# Patient Record
Sex: FEMALE | Race: WHITE | Employment: FULL TIME | URBAN - METROPOLITAN AREA
[De-identification: names, ages, dates, MRNs, and addresses within clinical notes are randomized per-mention and may not be internally consistent; named-entity substitution may affect disease eponyms.]

---

## 2018-06-09 ENCOUNTER — APPOINTMENT (OUTPATIENT)
Dept: ULTRASOUND IMAGING | Age: 48
End: 2018-06-09
Attending: EMERGENCY MEDICINE
Payer: COMMERCIAL

## 2018-06-09 ENCOUNTER — HOSPITAL ENCOUNTER (EMERGENCY)
Age: 48
Discharge: HOME | End: 2018-06-09
Attending: EMERGENCY MEDICINE
Payer: COMMERCIAL

## 2018-06-09 VITALS
HEART RATE: 86 BPM | TEMPERATURE: 98.5 F | SYSTOLIC BLOOD PRESSURE: 148 MMHG | BODY MASS INDEX: 29.57 KG/M2 | WEIGHT: 166.89 LBS | OXYGEN SATURATION: 98 % | DIASTOLIC BLOOD PRESSURE: 93 MMHG | RESPIRATION RATE: 18 BRPM | HEIGHT: 63 IN

## 2018-06-09 DIAGNOSIS — M79.10 MUSCLE PAIN: Primary | ICD-10-CM

## 2018-06-09 PROCEDURE — 99282 EMERGENCY DEPT VISIT SF MDM: CPT

## 2018-06-09 PROCEDURE — 93971 EXTREMITY STUDY: CPT

## 2018-06-09 RX ORDER — ESCITALOPRAM OXALATE 20 MG/1
20 TABLET ORAL DAILY
COMMUNITY
End: 2021-12-28 | Stop reason: ALTCHOICE

## 2018-06-09 NOTE — ED NOTES
Patient called out stating that she \"doesnt know if she wants to have ultrasound now and she can not make up her mind or decide. \" ER MD John Berman made aware. John Berman states \"do not cancel ultrasound order at this time and tell patient I still feel we should proceed with US test.\" No additional orders given at this time.

## 2018-06-09 NOTE — ED NOTES
Radiology called at this time to call in [address] for MD order to follow.

## 2018-06-09 NOTE — ED PROVIDER NOTES
EMERGENCY DEPARTMENT HISTORY AND PHYSICAL EXAM      Date: 6/9/2018  Patient Name: Vy Guadarrama    History of Presenting Illness     Chief Complaint   Patient presents with    Leg Pain     Ambulatory to triage. c/o left upper thigh/leg pain since Tuesday \"but it got worse last night and I couldn't sleep at all. \" Denies injury/trauma/falls or recent exercises/heavy lifting. \"I was googling stuff cause it worried me and I think it may be a blood clot. \" Denies blood thinner use. or taking medications prior to arrival.        History Provided By: Patient    HPI: Vy Guadarrama, 57 y.o. female with PMHx significant for depression, presents ambulatory to the ED with cc of a worsening L posterior thigh pain x 4 days. She denies any associated symptoms. Pt states the pain had resolved after applying heat to the affected area, but then returned this morning while she was asleep. Pt reports she had googled her symptoms and is concerned for a blood clot. She also notes her  had a pulmonary embolism, which increased her concerns. She states her sister denies any FHx of blood clots. She denies any recent long distance traveling. Pt denies self medicating with analgesics prior to arrival. She denies the use of hormone replacement therapy. Pt endorses the use of tobacco. She denies any leg swelling. Chief Complaint: thigh pain  Duration: 4 Days  Timing:  Worsening  Location: L posterior  Quality: N/A  Severity: N/A  Modifying Factors: N/A  Associated Symptoms: denies any other associated signs or symptoms    There are no other complaints, changes, or physical findings at this time. PCP: Milton Trent DO    Past History     Past Medical History:  Past Medical History:   Diagnosis Date    Depression     Psychiatric disorder        Past Surgical History:  History reviewed. No pertinent surgical history. Family History:  History reviewed. No pertinent family history.     Social History:  Social History   Substance Use Topics    Smoking status: Current Every Day Smoker     Packs/day: 0.50    Smokeless tobacco: Never Used    Alcohol use No       Allergies:  No Known Allergies    Review of Systems   Review of Systems   Constitutional: Negative for activity change, appetite change, fatigue and fever. HENT: Negative. Negative for congestion, rhinorrhea and sore throat. Respiratory: Negative. Negative for cough, shortness of breath and wheezing. Cardiovascular: Negative. Negative for chest pain and leg swelling. Gastrointestinal: Negative. Negative for abdominal distention, abdominal pain, constipation, diarrhea, nausea and vomiting. Endocrine: Negative. Genitourinary: Negative for difficulty urinating, dysuria, menstrual problem, vaginal bleeding and vaginal discharge. Musculoskeletal: Positive for myalgias (+LLE). Negative for arthralgias and joint swelling. Skin: Negative. Negative for rash. Neurological: Negative. Negative for dizziness, weakness, light-headedness and headaches. Psychiatric/Behavioral: Negative. Physical Exam   Physical Exam   Constitutional: She is oriented to person, place, and time. She appears well-developed and well-nourished. HENT:   Head: Atraumatic. Eyes: EOM are normal.   Cardiovascular: Normal rate, regular rhythm, normal heart sounds and intact distal pulses. Exam reveals no gallop and no friction rub. No murmur heard. Pulmonary/Chest: Effort normal and breath sounds normal. No respiratory distress. She has no wheezes. She has no rales. She exhibits no tenderness. Abdominal: Soft. Bowel sounds are normal. She exhibits no distension and no mass. There is no tenderness. There is no rebound and no guarding. Musculoskeletal: Normal range of motion. She exhibits no edema or tenderness.    Mild L mid posterior thigh tenderness, no obvious thigh circumference discrepancy, no calf tenderness, no BL calf swelling, strength normal, no evidence of obvious strain, mild pain with extension and stretching of hamstrings   Neurological: She is oriented to person, place, and time. Skin: Skin is warm. Psychiatric: She has a normal mood and affect. Nursing note and vitals reviewed. Diagnostic Study Results     Radiologic Studies -   DUPLEX LOWER EXT VENOUS LEFT   Final Result          LEFT LOWER EXTREMITY VENOUS DUPLEX ULTRASOUND     INDICATION: Leg swelling, pain, DVT suspected     COMPARISON: None.     PROCEDURE: Grayscale, color, and spectral Doppler ultrasound imaging of the left  lower extremity veins was performed.     FINDINGS:      The left common femoral, superficial femoral, and popliteal veins demonstrate  normal compressibility, flow, and response to augmentation. No echogenic  thrombi.     The visualized calf veins are patent, with normal compressibility and no  echogenic thrombi.      IMPRESSION  IMPRESSION:   No DVT. Medical Decision Making   I am the first provider for this patient. I reviewed the vital signs, available nursing notes, past medical history, past surgical history, family history and social history. Vital Signs-Reviewed the patient's vital signs. Patient Vitals for the past 12 hrs:   Temp Pulse Resp BP SpO2   06/09/18 0505 98.5 °F (36.9 °C) 86 18 (!) 148/93 98 %     Records Reviewed: Nursing Notes and Old Medical Records    Provider Notes (Medical Decision Making):   Pts risk factor would include smoking, not on birth control, no recent trip/travel, no recent immobilized state. Though concern for blood clot and inability to explain pain or other alternative explanation will obtain dopplar. ED Course:   Initial assessment performed. The patients presenting problems have been discussed, and they are in agreement with the care plan formulated and outlined with them. I have encouraged them to ask questions as they arise throughout their visit.     PROGRESS NOTE:  7:05 AM  Time of sign out is awaiting for doppler. No change of sxs at this time. SIGN OUT:  7:06 AM  Patient's presentation, labs/imaging and plan of care was reviewed with Juan Dupont MD as part of sign out. They will wait for doppler results and dispo as part of the plan discussed with the patient. Juan Dupont MD's assistance in completion of this plan is greatly appreciated but it should be noted that I will be the provider of record for this patient. Yoana Alamo MD    Critical Care Time:   0    Disposition:  DISCHARGE NOTE  8:00 AM  The patient has been re-evaluated and is ready for discharge. Reviewed available results with patient. Counseled patient on diagnosis and care plan. Patient has expressed understanding, and all questions have been answered. Patient agrees with plan and agrees to follow up as recommended, or return to the ED if their symptoms worsen. Discharge instructions have been provided and explained to the patient, along with reasons to return to the ED. PLAN:  1. Discharge  Discharge Medication List as of 6/9/2018  7:45 AM        2. Follow-up Information     Follow up With Details Comments [address] [place] [address], DO In 2 days As needed if symptoms persist [address] [place] [address]  [phone]      [facility] EMERGENCY DEPT  As needed, If symptoms worsen [address] [place] [address]  [phone]        Return to ED if worse     Diagnosis     Clinical Impression:   1. Muscle pain        Attestations: This note is prepared by Ronne Nyhan, acting as Scribe for Yoana Alamo MD.    Yoana Alamo MD: The scribe's documentation has been prepared under my direction and personally reviewed by me in its entirety. I confirm that the note above accurately reflects all work, treatment, procedures, and medical decision making performed by me.

## 2018-06-09 NOTE — DISCHARGE INSTRUCTIONS
Musculoskeletal Pain: Care Instructions  Your Care Instructions  Different problems with the bones, muscles, nerves, ligaments, and tendons in the body can cause pain. One or more areas of your body may ache or burn. Or they may feel tired, stiff, or sore. The medical term for this type of pain is musculoskeletal pain. It can have many different causes. Sometimes the pain is caused by an injury such as a strain or sprain. Or you might have pain from using one part of your body in the same way over and over again. This is called overuse. In some cases, the cause of the pain is another health problem such as arthritis or fibromyalgia. The doctor will examine you and ask you questions about your health to help find the cause of your pain. Blood tests or imaging tests like an X-ray may also be helpful. But sometimes doctors can't find a cause of the pain. Treatment depends on your symptoms and the cause of the pain, if known. The doctor has checked you carefully, but problems can develop later. If you notice any problems or new symptoms, get medical treatment right away. Follow-up care is a key part of your treatment and safety. Be sure to make and go to all appointments, and call your doctor if you are having problems. It's also a good idea to know your test results and keep a list of the medicines you take. How can you care for yourself at home? · Rest until you feel better. · Do not do anything that makes the pain worse. Return to exercise gradually if you feel better and your doctor says it's okay. · Be safe with medicines. Read and follow all instructions on the label. ¨ If the doctor gave you a prescription medicine for pain, take it as prescribed. ¨ If you are not taking a prescription pain medicine, ask your doctor if you can take an over-the-counter medicine. · Put ice or a cold pack on the area for 10 to 20 minutes at a time to ease pain. Put a thin cloth between the ice and your skin.   When should you call for help? Call your doctor now or seek immediate medical care if:  · You have new pain, or your pain gets worse. · You have new symptoms such as a fever, a rash, or chills. Watch closely for changes in your health, and be sure to contact your doctor if:  · You do not get better as expected. Where can you learn more? Go to [employer]  Enter Q624 in the search box to learn more about \"Musculoskeletal Pain: Care Instructions. \"   © 1418-8536 [employer]wise, Incorporated. Care instructions adapted under license by [facility] (which disclaims liability or warranty for this information). This care instruction is for use with your licensed healthcare professional. If you have questions about a medical condition or this instruction, always ask your healthcare professional. [address] any warranty or liability for your use of this information.   Content Version: 12.2.623129; Current as of: November 20, 2015

## 2020-08-18 ENCOUNTER — OFFICE VISIT (OUTPATIENT)
Dept: CARDIOLOGY CLINIC | Age: 50
End: 2020-08-18
Payer: COMMERCIAL

## 2020-08-18 VITALS
BODY MASS INDEX: 31.64 KG/M2 | OXYGEN SATURATION: 97 % | HEIGHT: 63 IN | HEART RATE: 62 BPM | SYSTOLIC BLOOD PRESSURE: 108 MMHG | DIASTOLIC BLOOD PRESSURE: 80 MMHG | WEIGHT: 178.6 LBS | RESPIRATION RATE: 16 BRPM

## 2020-08-18 DIAGNOSIS — I49.8 FLUTTERING HEART: Primary | ICD-10-CM

## 2020-08-18 PROCEDURE — 93000 ELECTROCARDIOGRAM COMPLETE: CPT | Performed by: INTERNAL MEDICINE

## 2020-08-18 PROCEDURE — 99203 OFFICE O/P NEW LOW 30 MIN: CPT | Performed by: INTERNAL MEDICINE

## 2020-08-18 RX ORDER — MONTELUKAST SODIUM 10 MG/1
10 TABLET ORAL DAILY
COMMUNITY
Start: 2020-08-05 | End: 2021-12-28

## 2020-08-18 RX ORDER — ALBUTEROL SULFATE 90 UG/1
1 AEROSOL, METERED RESPIRATORY (INHALATION)
COMMUNITY
Start: 2020-08-05

## 2020-08-18 NOTE — PROGRESS NOTES
1. Have you been to the ER, urgent care clinic since your last visit? Hospitalized since your last visit? No.    2. Have you seen or consulted any other health care providers outside of the [address] [place] [address] since your last visit? Include any pap smears or colon screening. Seen by PCP.       Chief Complaint   Patient presents with    New Patient     referred by PCP for heart fluttering for 3 weeks on and off

## 2020-08-18 NOTE — PROGRESS NOTES
[address] [place] [address] [address], [employer], [address]     Barney Saha is a 52 y.o. female. Pt is here to establish care. Subjective:     Her most recent lipid panel revealed an LDL of 126. Her BP is normal at 108/80. She has a hx of asthma that is managed with albuterol and Singulair. She has a family hx of MI's. Her sister had one at age 46 and her father at age 43 and 48 that led to his passing. She endorses palpitations for the past few weeks that have become less severe over time. She describes the sensation as an irregular rhythm with no concurrent sx's. She denies a hx of tobacco abuse    Patient denies any exertional chest pain, dyspnea, syncope, orthopnea, edema or paroxysmal nocturnal dyspnea. There are no active problems to display for this patient. Jo Ann Gutierrez DO  Past Medical History:   Diagnosis Date    Depression     Psychiatric disorder       No past surgical history on file. No Known Allergies   No family history on file.    Social History     Socioeconomic History    Marital status:      Spouse name: Not on file    Number of children: Not on file    Years of education: Not on file    Highest education level: Not on file   Occupational History    Not on file   Social Needs    Financial resource strain: Not on file    Food insecurity     Worry: Not on file     Inability: Not on file    Transportation needs     Medical: Not on file     Non-medical: Not on file   Tobacco Use    Smoking status: Former Smoker     Packs/day: 0.50     Types: Cigarettes     Last attempt to quit: 3/18/2019     Years since quittin.4    Smokeless tobacco: Never Used   Substance and Sexual Activity    Alcohol use: No    Drug use: No    Sexual activity: Not on file   Lifestyle    Physical activity     Days per week: Not on file     Minutes per session: Not on file    Stress: Not on file   Relationships    Social connections     Talks on phone: Not on file     Gets together: Not on file     Attends Yazidi service: Not on file     Active member of club or organization: Not on file     Attends meetings of clubs or organizations: Not on file     Relationship status: Not on file    Intimate partner violence     Fear of current or ex partner: Not on file     Emotionally abused: Not on file     Physically abused: Not on file     Forced sexual activity: Not on file   Other Topics Concern    Not on file   Social History Narrative    Not on file      Current Outpatient Medications   Medication Sig    albuterol (PROVENTIL HFA, VENTOLIN HFA, PROAIR HFA) 90 mcg/actuation inhaler Take 1 Puff by inhalation every six (6) hours as needed.  montelukast (SINGULAIR) 10 mg tablet Take 10 mg by mouth daily.  escitalopram oxalate (LEXAPRO) 20 mg tablet Take 20 mg by mouth daily. No current facility-administered medications for this visit. Review of Systems  Constitutional: Negative for fever, chills, malaise/fatigue and diaphoresis. Respiratory: Negative for cough, hemoptysis, sputum production, shortness of breath and wheezing. Cardiovascular: +palpitations. Negative for chest pain, orthopnea, claudication, leg swelling and PND. Gastrointestinal: Negative for heartburn, nausea, vomiting, blood in stool and melena. Genitourinary: Negative for dysuria and flank pain. Musculoskeletal: Negative for joint pain and back pain. Skin: Negative for rash. Neurological: Negative for focal weakness, seizures, loss of consciousness, weakness and headaches. Endo/Heme/Allergies: Does not bruise/bleed easily. Psychiatric/Behavioral: Negative for memory loss. The patient does not have insomnia.     Physical Exam:    Visit Vitals  /80 (BP 1 Location: Left arm, BP Patient Position: Sitting)   Pulse 62   Resp 16   Ht 5' 3\" (1.6 m)   Wt 178 lb 9.6 oz (81 kg)   SpO2 97%   BMI 31.64 kg/m²     Wt Readings from Last 3 Encounters:   08/18/20 178 lb 9.6 oz (81 kg)   06/09/18 166 lb 14.2 oz (75.7 kg)       Gen: NAD    Mental Status - Alert. General Appearance - Not in acute distress. Neck - no JVD    Chest and Lung Exam   Inspection: Accessory muscles - No use of accessory muscles in breathing. Auscultation:   Breath sounds: - Normal.     Cardiovascular   Inspection: Jugular vein - Bilateral - Inspection Normal.   Palpation/Percussion:   Apical Impulse: - Normal.   Auscultation: Rhythm - Irregular. Heart Sounds - S1 WNL and S2 WNL. No S3 or S4. Murmurs & Other Heart Sounds: Auscultation of the heart reveals - No Murmurs. Peripheral Vascular   Upper Extremity: Inspection - Bilateral - No Cyanotic nailbeds or Digital clubbing. Lower Extremity:   Palpation: Edema - Bilateral - No edema. Abdomen: Soft, non-tender, bowel sounds are active. Neuro: A&O times 3, CN and motor grossly WNL    Cardiographics  EKG - Sinus bradycardia       Assessment:     Encounter Diagnosis   Name Primary?  Fluttering heart Yes        Plan:     Her BP is normal. She will complete an event monitor for evaluation of heart palpitations.        Written by Angela Betancourt, as dictated by José Antonio Saini MD.

## 2020-08-20 ENCOUNTER — TELEPHONE (OUTPATIENT)
Dept: CARDIOLOGY CLINIC | Age: 50
End: 2020-08-20

## 2020-08-20 NOTE — TELEPHONE ENCOUNTER
Pt states that she is calling in regards to a heart monitor; pt states that she was seen on Tuesday and said that a nurse or someone was suppose to give her a call back in regards to this; please call pt back        Thanks

## 2020-08-21 ENCOUNTER — CLINICAL SUPPORT (OUTPATIENT)
Dept: CARDIOLOGY CLINIC | Age: 50
End: 2020-08-21
Payer: COMMERCIAL

## 2020-08-21 DIAGNOSIS — R00.2 PALPITATIONS: Primary | ICD-10-CM

## 2020-08-21 PROCEDURE — 93228 REMOTE 30 DAY ECG REV/REPORT: CPT | Performed by: INTERNAL MEDICINE

## 2020-09-09 NOTE — PROGRESS NOTES
Please let pt know that she had some early beats which correlated to her symptom events. These are not considered harmful. She had one few second burst which wasn't marked as being a symptom event, that looked like it could be a fast beat coming from the top chambers of her heart. May have been atrial fibrillation. However, it was very brief. F/U with Dr Aleyda Carrillo to review.

## 2020-09-10 ENCOUNTER — TELEPHONE (OUTPATIENT)
Dept: CARDIOLOGY CLINIC | Age: 50
End: 2020-09-10

## 2020-09-21 NOTE — PROGRESS NOTES
[address] [place] [address] [address], [place], [address]        Subjective:     Patient is here today to f/u after reporting palpitations. Event monitor was obtained which showed Sinus Rhythm with symptomatic pvcs. Short burst of paf with rvr noted. She continues to have episodes of palpitations, brief fluttering sensation. They can occur at any time. She found out that her uncle has afib and her maternal grandfather has afib. Patient denies any exertional chest pain, dyspnea, syncope, orthopnea, edema or paroxysmal nocturnal dyspnea. There are no active problems to display for this patient. Alexx Howard DO  Past Medical History:   Diagnosis Date    Depression     Psychiatric disorder       No past surgical history on file. No Known Allergies   No family history on file.    Social History     Socioeconomic History    Marital status:      Spouse name: Not on file    Number of children: Not on file    Years of education: Not on file    Highest education level: Not on file   Occupational History    Not on file   Social Needs    Financial resource strain: Not on file    Food insecurity     Worry: Not on file     Inability: Not on file    Transportation needs     Medical: Not on file     Non-medical: Not on file   Tobacco Use    Smoking status: Former Smoker     Packs/day: 0.50     Types: Cigarettes     Last attempt to quit: 3/18/2019     Years since quittin.5    Smokeless tobacco: Never Used   Substance and Sexual Activity    Alcohol use: No    Drug use: No    Sexual activity: Not on file   Lifestyle    Physical activity     Days per week: Not on file     Minutes per session: Not on file    Stress: Not on file   Relationships    Social connections     Talks on phone: Not on file     Gets together: Not on file     Attends Yazidi service: Not on file     Active member of club or organization: Not on file     Attends meetings of clubs or organizations: Not on file Relationship status: Not on file    Intimate partner violence     Fear of current or ex partner: Not on file     Emotionally abused: Not on file     Physically abused: Not on file     Forced sexual activity: Not on file   Other Topics Concern    Not on file   Social History Narrative    Not on file      Current Outpatient Medications   Medication Sig    metoprolol succinate (TOPROL-XL) 25 mg XL tablet Take 1 Tab by mouth every evening.  aspirin delayed-release 81 mg tablet Take 1 Tab by mouth daily.  albuterol (PROVENTIL HFA, VENTOLIN HFA, PROAIR HFA) 90 mcg/actuation inhaler Take 1 Puff by inhalation every six (6) hours as needed.  montelukast (SINGULAIR) 10 mg tablet Take 10 mg by mouth daily.  escitalopram oxalate (LEXAPRO) 20 mg tablet Take 20 mg by mouth daily. No current facility-administered medications for this visit. Review of Systems  Constitutional: Negative for fever, chills, malaise/fatigue and diaphoresis. Respiratory: Negative for cough, hemoptysis, sputum production, shortness of breath and wheezing. Cardiovascular: +palpitations. Negative for chest pain, orthopnea, claudication, leg swelling and PND. Gastrointestinal: Negative for heartburn, nausea, vomiting, blood in stool and melena. Genitourinary: Negative for dysuria and flank pain. Musculoskeletal: Negative for joint pain and back pain. Skin: Negative for rash. Neurological: Negative for focal weakness, seizures, loss of consciousness, weakness and headaches. Endo/Heme/Allergies: Does not bruise/bleed easily. Psychiatric/Behavioral: Negative for memory loss. The patient does not have insomnia.     Physical Exam:    Visit Vitals  /84 (BP 1 Location: Left arm, BP Patient Position: Sitting)   Pulse 69   Resp 15   Ht 5' 3\" (1.6 m)   Wt 181 lb (82.1 kg)   SpO2 97%   BMI 32.06 kg/m²     Wt Readings from Last 3 Encounters:   09/22/20 181 lb (82.1 kg)   08/18/20 178 lb 9.6 oz (81 kg)   06/09/18 166 lb 14.2 oz (75.7 kg)       Gen: NAD    Mental Status - Alert. General Appearance - Not in acute distress. Neck - no JVD    Chest and Lung Exam   Inspection: Accessory muscles - No use of accessory muscles in breathing. Auscultation:   Breath sounds: - Normal.     Cardiovascular   Inspection: Jugular vein - Bilateral - Inspection Normal.   Palpation/Percussion:   Apical Impulse: - Normal.   Auscultation: Rhythm - regular. Heart Sounds - S1 WNL and S2 WNL. No S3 or S4. Murmurs & Other Heart Sounds: Auscultation of the heart reveals - No Murmurs. Peripheral Vascular   Upper Extremity: Inspection - Bilateral - No Cyanotic nailbeds or Digital clubbing. Lower Extremity:   Palpation: Edema - Bilateral - No edema. Abdomen: Soft, non-tender, bowel sounds are active. Neuro: A&O times 3, CN and motor grossly WNL    Cardiographics  EKG - Sinus Rhythm       Assessment:     Encounter Diagnoses   Name Primary?  Palpitations Yes    Fam hx-ischem heart disease         Plan:     Palpitations: event monitor was obtained which showed symptom events correlated to PVCs. She had a short burst of atrial fib with RVR. She is a chadsvasc of 1. Family hx of afib. Start Toprol XL 25mg daily, ASA 81mg daily. Family hx of CAD: continue risk factor modification, routine exercise, maintain age appropriate BMI. Cholesterol LDL goal 100 or lower. F/U in 1 months.      Kyle Arellano MD

## 2020-09-22 ENCOUNTER — OFFICE VISIT (OUTPATIENT)
Dept: CARDIOLOGY CLINIC | Age: 50
End: 2020-09-22
Payer: COMMERCIAL

## 2020-09-22 VITALS
BODY MASS INDEX: 32.07 KG/M2 | SYSTOLIC BLOOD PRESSURE: 114 MMHG | HEIGHT: 63 IN | RESPIRATION RATE: 15 BRPM | HEART RATE: 69 BPM | OXYGEN SATURATION: 97 % | WEIGHT: 181 LBS | DIASTOLIC BLOOD PRESSURE: 84 MMHG

## 2020-09-22 DIAGNOSIS — Z82.49 FAM HX-ISCHEM HEART DISEASE: ICD-10-CM

## 2020-09-22 DIAGNOSIS — R00.2 PALPITATIONS: Primary | ICD-10-CM

## 2020-09-22 PROCEDURE — 93000 ELECTROCARDIOGRAM COMPLETE: CPT | Performed by: INTERNAL MEDICINE

## 2020-09-22 PROCEDURE — 99214 OFFICE O/P EST MOD 30 MIN: CPT | Performed by: INTERNAL MEDICINE

## 2020-09-22 RX ORDER — ASPIRIN 81 MG/1
81 TABLET ORAL DAILY
Qty: 30 TAB | Refills: 5 | Status: SHIPPED | OUTPATIENT
Start: 2020-09-22 | End: 2021-12-28

## 2020-09-22 RX ORDER — METOPROLOL SUCCINATE 25 MG/1
25 TABLET, EXTENDED RELEASE ORAL EVERY EVENING
Qty: 30 TAB | Refills: 5 | Status: SHIPPED | OUTPATIENT
Start: 2020-09-22 | End: 2021-07-28 | Stop reason: SDUPTHER

## 2020-09-22 NOTE — PROGRESS NOTES
1. Have you been to the ER, urgent care clinic since your last visit? Hospitalized since your last visit? No.    2. Have you seen or consulted any other health care providers outside of the [address] [place] [address] since your last visit? Include any pap smears or colon screening.    No.      Chief Complaint   Patient presents with    Results     discuss monitor results-Afib- still feeling heart flutterings

## 2021-01-21 ENCOUNTER — VIRTUAL VISIT (OUTPATIENT)
Dept: NEUROLOGY | Age: 51
End: 2021-01-21
Payer: COMMERCIAL

## 2021-01-21 DIAGNOSIS — R20.2 PARESTHESIA: ICD-10-CM

## 2021-01-21 DIAGNOSIS — D32.9 MENINGIOMA (HCC): Primary | ICD-10-CM

## 2021-01-21 DIAGNOSIS — H55.00 NYSTAGMUS: ICD-10-CM

## 2021-01-21 PROCEDURE — 99205 OFFICE O/P NEW HI 60 MIN: CPT | Performed by: PSYCHIATRY & NEUROLOGY

## 2021-01-21 NOTE — PROGRESS NOTES
Neurology Consult Note  Rickey Lowe was seen by synchronous (real-time) audio-video technology on 01/21/21. Consent:  She  is aware that this patient-initiated Telehealth encounter is a billable service, with coverage as determined by her insurance carrier. She is aware that she may receive a bill and has provided verbal consent to proceed: Yes    I was in the office while conducting this encounter. Pursuant to the emergency declaration under the [address] [place] [address] waiver authority and the [employer] Act, this Virtual  Visit was conducted, with patient's consent, to reduce the patient's risk of exposure to COVID-19 and provide continuity of care for an established patient. Services were provided through a video synchronous discussion virtually to substitute for in-person clinic visit. HISTORY PROVIDED BY: patient    Chief Complaint:   Chief Complaint   Patient presents with    New Patient      Subjective:    Rickey Lowe is a 52 y.o. right handed female who presents in consultation for vertigo, abnormal brain MRI. This is a 46-year-old right-handed female with history of major depressive disorder, paroxysmal atrial fibrillation, who was referred to the clinic to evaluate for vertigo and abnormal MRI. According to patient, in November 2020, she suddenly started experiencing vertigo that is spinning sensation, it was so severe patient thought she was blacked out. At this point patient went to emergency room and was evaluated for acute stroke. CT scan showed abnormal lesion, follow-up MRI without contrast showed right temporal anterior lobe extra axial mass measuring 13 x 13 mm consistent with possible meningioma. Currently patient says the vertigo is gone, she occasionally experiences numbness or tingling sensation of the hands. There is also occasional headache.   As per standard of care, I will obtain MRI of the brain with and without gadolinium to qualify the lesion. Patient denies loss of consciousness dysphagia or odynophagia. Review of Systems - General ROS: negative for - fatigue or sleep disturbance  Psychological ROS: positive for - anxiety and depression  Ophthalmic ROS: positive for - blurry vision and decreased vision  ENT ROS: positive for - headaches, vertigo and visual changes  Allergy and Immunology ROS: negative  Hematological and Lymphatic ROS: negative  Endocrine ROS: negative  Respiratory ROS: no cough, shortness of breath, or wheezing  Cardiovascular ROS: no chest pain or dyspnea on exertion  Gastrointestinal ROS: no abdominal pain, change in bowel habits, or black or bloody stools  Genito-Urinary ROS: no dysuria, trouble voiding, or hematuria  Musculoskeletal ROS: negative  Neurological ROS: positive for - dizziness, headaches, numbness/tingling and visual changes  Dermatological ROS: negative    Past Medical History:   Diagnosis Date    Depression     Psychiatric disorder       History reviewed. No pertinent surgical history.    Social History     Socioeconomic History    Marital status:      Spouse name: Not on file    Number of children: Not on file    Years of education: Not on file    Highest education level: Not on file   Occupational History    Not on file   Social Needs    Financial resource strain: Not on file    Food insecurity     Worry: Not on file     Inability: Not on file    Transportation needs     Medical: Not on file     Non-medical: Not on file   Tobacco Use    Smoking status: Former Smoker     Packs/day: 0.50     Types: Cigarettes     Quit date: 3/18/2019     Years since quittin.8    Smokeless tobacco: Never Used   Substance and Sexual Activity    Alcohol use: No    Drug use: No    Sexual activity: Not on file   Lifestyle    Physical activity     Days per week: Not on file     Minutes per session: Not on file    Stress: Not on file Relationships    Social connections     Talks on phone: Not on file     Gets together: Not on file     Attends Protestant service: Not on file     Active member of club or organization: Not on file     Attends meetings of clubs or organizations: Not on file     Relationship status: Not on file    Intimate partner violence     Fear of current or ex partner: Not on file     Emotionally abused: Not on file     Physically abused: Not on file     Forced sexual activity: Not on file   Other Topics Concern    Not on file   Social History Narrative    Not on file     History reviewed. No pertinent family history. Objective:   ROS  As per HPI    No Known Allergies     Meds:  Outpatient Medications Prior to Visit   Medication Sig Dispense Refill    metoprolol succinate (TOPROL-XL) 25 mg XL tablet Take 1 Tab by mouth every evening. 30 Tab 5    aspirin delayed-release 81 mg tablet Take 1 Tab by mouth daily. 30 Tab 5    albuterol (PROVENTIL HFA, VENTOLIN HFA, PROAIR HFA) 90 mcg/actuation inhaler Take 1 Puff by inhalation every six (6) hours as needed.  escitalopram oxalate (LEXAPRO) 20 mg tablet Take 20 mg by mouth daily.  montelukast (SINGULAIR) 10 mg tablet Take 10 mg by mouth daily. No facility-administered medications prior to visit. Imaging:  MRI Results (most recent):  No results found for this or any previous visit. CT Results (most recent):  No results found for this or any previous visit. Reviewed records in [employer] [facility] [employer] tab today    Lab Review   No results found for this or any previous visit. Exam:  There were no vitals taken for this visit. General:  Alert, cooperative, no distress. Head:  Normocephalic, without obvious abnormality, atraumatic. Respiratory:  Heart:   Non labored breathing  deferred   Neck:   deferred   Extremities:  no cyanosis or edema. Pulses: deferred       Neurologic:  MS: Alert and oriented x 4, speech intact.  Language intact, able to name, repeat, and follow all commands. Attention and fund of knowledge appropriate. Recent and remote memory intact. Cranial Nerves:  II: visual fields deferred   II: pupils Equal, round,   II: optic disc deferred   III,VII: ptosis none   III,IV,VI: extraocular muscles  EOMI, no nystagmus or diplopia   V: facial light touch sensation  deferred   VII: facial muscle function   symmetric   VIII: hearing intact   IX: soft palate elevation  deferred   XI: trapezius strength   good neck movement   XI: sternocleidomastoid strength  good shrug   XII: tongue  Midline     Motor: normal bulk  no tremor              Strength: Moves all extremities, no PD  Sensory: Deferred  Coordination: FTN and HTS intact, ERIC intact. Gait: normal gait. Reflexes:Deferred           Assessment/Plan       ICD-10-CM ICD-9-CM    1. Meningioma (HCC)  D32.9 225.2    2. Paresthesia  R20.2 782.0    Plan:  I will obtain MRI of the brain with and without gadolinium to evaluate for intracranial lesion. I will see patient back in 4 weeks to go over the result and decide on the next plan of management. Thank you very much for this consultation.      Signed:  Radha Valles MD  1/21/2021 Hypertension Bacteriuria

## 2021-01-28 ENCOUNTER — HOSPITAL ENCOUNTER (OUTPATIENT)
Dept: MRI IMAGING | Age: 51
Discharge: HOME | End: 2021-01-28
Attending: PSYCHIATRY & NEUROLOGY
Payer: COMMERCIAL

## 2021-01-28 DIAGNOSIS — R20.2 PARESTHESIA: ICD-10-CM

## 2021-01-28 DIAGNOSIS — H55.00 NYSTAGMUS: ICD-10-CM

## 2021-01-28 DIAGNOSIS — D32.9 MENINGIOMA (HCC): ICD-10-CM

## 2021-01-28 PROCEDURE — 70553 MRI BRAIN STEM W/O & W/DYE: CPT

## 2021-01-28 PROCEDURE — 74011250636 HC RX REV CODE- 250/636: Performed by: PSYCHIATRY & NEUROLOGY

## 2021-01-28 PROCEDURE — A9575 INJ GADOTERATE MEGLUMI 0.1ML: HCPCS | Performed by: PSYCHIATRY & NEUROLOGY

## 2021-01-28 RX ORDER — GADOTERATE MEGLUMINE 376.9 MG/ML
17 INJECTION INTRAVENOUS
Status: COMPLETED | OUTPATIENT
Start: 2021-01-28 | End: 2021-01-28

## 2021-01-28 RX ADMIN — GADOTERATE MEGLUMINE 17 ML: 376.9 INJECTION INTRAVENOUS at 18:38

## 2021-03-05 ENCOUNTER — OFFICE VISIT (OUTPATIENT)
Dept: NEUROLOGY | Age: 51
End: 2021-03-05
Payer: COMMERCIAL

## 2021-03-05 VITALS
RESPIRATION RATE: 18 BRPM | TEMPERATURE: 98.7 F | SYSTOLIC BLOOD PRESSURE: 122 MMHG | BODY MASS INDEX: 33.66 KG/M2 | HEART RATE: 77 BPM | OXYGEN SATURATION: 98 % | HEIGHT: 63 IN | WEIGHT: 190 LBS | DIASTOLIC BLOOD PRESSURE: 78 MMHG

## 2021-03-05 DIAGNOSIS — D32.9 MENINGIOMA (HCC): Primary | ICD-10-CM

## 2021-03-05 PROCEDURE — 99214 OFFICE O/P EST MOD 30 MIN: CPT | Performed by: PSYCHIATRY & NEUROLOGY

## 2021-03-05 NOTE — PROGRESS NOTES
Neurology Progress Note    NAME:  Latia Joseph   :   1970   MRN:   [id number]     Date/Time:  3/5/2021  Subjective:      Latia Joseph is a 52 y.o. female here today for follow-up for abnormal MRI, vertigo, test results. Patient was accompanied by her . She says she has been doing fairly well, no vertigo headache or dizziness since the last visit. She says her numbness is very minimal, only in her hands when she uses her hands a lot. Denies loss of consciousness, dysphagia, odynophagia. MRI of the brain reviewed with patient showed enhancing lesion in the right sphenoid measuring 1.6 x 1.3 x 1.6 cm with dural tail consistent with right sphenoid wing meningioma. I explained to patient and her  that the lesion is benign and does not need any surgical intervention, will monitor every 3 to 4 years or if there is any new symptoms. Review of Systems - General ROS: negative for - fatigue or sleep disturbance  Psychological ROS: positive for - anxiety and depression  Ophthalmic ROS: positive for - blurry vision and decreased vision  ENT ROS: positive for - headaches, vertigo and visual changes  Allergy and Immunology ROS: negative  Hematological and Lymphatic ROS: negative  Endocrine ROS: negative  Respiratory ROS: no cough, shortness of breath, or wheezing  Cardiovascular ROS: no chest pain or dyspnea on exertion  Gastrointestinal ROS: no abdominal pain, change in bowel habits, or black or bloody stools  Genito-Urinary ROS: no dysuria, trouble voiding, or hematuria  Musculoskeletal ROS: negative  Neurological ROS: positive for - dizziness, headaches, numbness/tingling and visual changes  Dermatological ROS: negative      Medications reviewed:  Current Outpatient Medications   Medication Sig Dispense Refill    metoprolol succinate (TOPROL-XL) 25 mg XL tablet Take 1 Tab by mouth every evening. 30 Tab 5    aspirin delayed-release 81 mg tablet Take 1 Tab by mouth daily.  30 Tab 5    albuterol (PROVENTIL HFA, VENTOLIN HFA, PROAIR HFA) 90 mcg/actuation inhaler Take 1 Puff by inhalation every six (6) hours as needed.  escitalopram oxalate (LEXAPRO) 20 mg tablet Take 20 mg by mouth daily.  montelukast (SINGULAIR) 10 mg tablet Take 10 mg by mouth daily. Objective:   Vitals:  Vitals:    03/05/21 1544   BP: 122/78   Pulse: 77   Resp: 18   Temp: 98.7 °F (37.1 °C)   TempSrc: Temporal   SpO2: 98%   Weight: 190 lb (86.2 kg)   Height: 5' 3\" (1.6 m)   PainSc:   0 - No pain               Lab Data Reviewed:  No results found for: WBC, HCT, HGB, PLT, HCTEXT, HGBEXT, PLTEXT    No results found for: NA, K, CL, CO2, GLU, BUN, CREA, CA    No components found for: TROPQUANT    No results found for: ROSEANNA      No results found for: HBA1C, HGBE8, CJG1TBHO, UDH2BRQZ     No results found for: B12LT, FOL, RBCF    No results found for: ROSEANNA, ANARX, ANAIGG, XBANA    No results found for: CHOL, CHOLPOCT, CHOLX, CHLST, CHOLV, HDL, HDLPOC, HDLP, LDL, LDLCPOC, LDLC, DLDLP, VLDLC, VLDL, TGLX, TRIGL, TRIGP, TGLPOCT, CHHD, CHHDX      CT Results (recent):  No results found for this or any previous visit. MRI Results (recent):  Results from [place] encounter on 01/28/21   MRI BRAIN W WO CONT    Narrative EXAM:  MRI BRAIN W WO CONT    INDICATION:    Meningioma. COMPARISON:  None. CONTRAST: 17 ml Dot[employer]. TECHNIQUE:    Multiplanar multisequence acquisition without and with contrast of the brain. FINDINGS:  Along the right greater wing of the sphenoid and the anterior middle cranial  fossa, there is a 1.6 x 1.3 x 1.6 cm avidly enhancing, dural based extra-axial  mass (series 9 image 9 and series 10 image 8). There is no significant  surrounding parenchymal edema. The ventricles are normal in size and position. No evidence of acute infarct or  hemorrhage. There is no cerebellar tonsillar herniation. Expected arterial  flow-voids are present.  The paranasal sinuses, mastoid air cells, and middle  ears are clear. The orbital contents are within normal limits. No significant  osseous or scalp lesions are identified. Impression 1. A 1.6 x 1.3 x 1.6 cm right sphenoid wing meningioma. No significant  surrounding parenchymal edema. IR Results (recent):  No results found for this or any previous visit. VAS/US Results (recent):  Results from Hospital Encounter encounter on 06/09/18   DUPLEX LOWER EXT VENOUS LEFT    Narrative LEFT LOWER EXTREMITY VENOUS DUPLEX ULTRASOUND    INDICATION: Leg swelling, pain, DVT suspected    COMPARISON: None. PROCEDURE: Grayscale, color, and spectral Doppler ultrasound imaging of the left  lower extremity veins was performed. FINDINGS:     The left common femoral, superficial femoral, and popliteal veins demonstrate  normal compressibility, flow, and response to augmentation. No echogenic  thrombi. The visualized calf veins are patent, with normal compressibility and no  echogenic thrombi. Impression IMPRESSION:   No DVT. PHYSICAL EXAM:  General:    Alert, cooperative, no distress, appears stated age. Head:   Normocephalic, without obvious abnormality, atraumatic. Eyes:   Conjunctivae/corneas clear. PERRLA  Nose:  Nares normal. No drainage or sinus tenderness. Throat:    Lips, mucosa, and tongue normal.  No Thrush  Neck:  Supple, symmetrical,  no adenopathy, thyroid: non tender    no carotid bruit and no JVD. Back:    Symmetric,  No CVA tenderness. Lungs:   Clear to auscultation bilaterally. No Wheezing or Rhonchi. No rales. Chest wall:  No tenderness or deformity. No Accessory muscle use. Heart:   Regular rate and rhythm,  no murmur, rub or gallop. Abdomen:   Soft, non-tender. Not distended. Bowel sounds normal. No masses  Extremities: Extremities normal, atraumatic, No cyanosis. No edema. No clubbing  Skin:     Texture, turgor normal. No rashes or lesions.   Not Jaundiced  Lymph nodes: Cervical, supraclavicular normal.  Psych:  Good insight. Not depressed. Not anxious or agitated. NEUROLOGICAL EXAM:  Appearance: The patient is well developed, well nourished, provides a coherent history and is in no acute distress. Mental Status: Oriented to time, place and person. Mood and affect appropriate. Cranial Nerves:   Intact visual fields. Fundi are benign. TANYA, EOM's full, no nystagmus, no ptosis. Facial sensation is normal. Corneal reflexes are intact. Facial movement is symmetric. Hearing is normal bilaterally. Palate is midline with normal sternocleidomastoid and trapezius muscles are normal. Tongue is midline. Motor:  5/5 strength in upper and lower proximal and distal muscles. Normal bulk and tone. No fasciculations. Reflexes:   Deep tendon reflexes 2+/4 and symmetrical.   Sensory:   Normal to touch, pinprick and vibration. Gait:  Normal gait. Tremor:   No tremor noted. Cerebellar:  No cerebellar signs present. Neurovascular:  Normal heart sounds and regular rhythm, peripheral pulses intact, and no carotid bruits. Assesment  1. Meningioma ([address]  Stable    ___________________________________________________  PLAN: Medication and plan discussed with patient and her . ICD-10-CM ICD-9-CM    1. Meningioma (HCC)  D32.9 225.2      Follow-up and Dispositions    · Return if symptoms worsen or fail to improve. Condition discussed all questions answered.            ___________________________________________________    Attending Physician: Gina Ramsey MD

## 2021-07-28 ENCOUNTER — TELEPHONE (OUTPATIENT)
Dept: CARDIOLOGY CLINIC | Age: 51
End: 2021-07-28

## 2021-07-28 RX ORDER — METOPROLOL SUCCINATE 25 MG/1
25 TABLET, EXTENDED RELEASE ORAL EVERY EVENING
Qty: 30 TABLET | Refills: 1 | Status: SHIPPED | OUTPATIENT
Start: 2021-07-28 | End: 2021-11-22

## 2021-07-28 NOTE — TELEPHONE ENCOUNTER
Pt needs refill on medication metoprolol succinate (TOPROL-XL) 25 mg XL tablet. Please send to [id number] - [place]iona Brown, Edilma Giang .   Has appt scheduled for [email or web address] with Dr. Jak Caldwell

## 2021-07-28 NOTE — TELEPHONE ENCOUNTER
Seen in office 9-. Was due back Oct 2020. Never returned.     Pt. Made appt for 8-.    ? Okay to fill Toprol or see in office first?

## 2021-10-30 ENCOUNTER — DOCUMENTATION ONLY (OUTPATIENT)
Dept: CARDIOLOGY CLINIC | Age: 51
End: 2021-10-30

## 2021-11-22 RX ORDER — METOPROLOL SUCCINATE 25 MG/1
TABLET, EXTENDED RELEASE ORAL
Qty: 30 TABLET | Refills: 0 | Status: SHIPPED | OUTPATIENT
Start: 2021-11-22 | End: 2021-12-28 | Stop reason: SDUPTHER

## 2021-12-28 ENCOUNTER — OFFICE VISIT (OUTPATIENT)
Dept: CARDIOLOGY CLINIC | Age: 51
End: 2021-12-28
Payer: COMMERCIAL

## 2021-12-28 VITALS
BODY MASS INDEX: 34.14 KG/M2 | OXYGEN SATURATION: 96 % | RESPIRATION RATE: 18 BRPM | HEIGHT: 63 IN | HEART RATE: 77 BPM | DIASTOLIC BLOOD PRESSURE: 84 MMHG | SYSTOLIC BLOOD PRESSURE: 122 MMHG | WEIGHT: 192.7 LBS

## 2021-12-28 DIAGNOSIS — I10 PRIMARY HYPERTENSION: Primary | ICD-10-CM

## 2021-12-28 DIAGNOSIS — R00.2 PALPITATIONS: ICD-10-CM

## 2021-12-28 PROCEDURE — 93000 ELECTROCARDIOGRAM COMPLETE: CPT | Performed by: INTERNAL MEDICINE

## 2021-12-28 PROCEDURE — 99214 OFFICE O/P EST MOD 30 MIN: CPT | Performed by: INTERNAL MEDICINE

## 2021-12-28 RX ORDER — FLUTICASONE PROPIONATE 100 UG/1
POWDER, METERED RESPIRATORY (INHALATION)
COMMUNITY
Start: 2021-12-22

## 2021-12-28 RX ORDER — METOPROLOL SUCCINATE 25 MG/1
TABLET, EXTENDED RELEASE ORAL
Qty: 90 TABLET | Refills: 3 | Status: SHIPPED | OUTPATIENT
Start: 2021-12-28

## 2021-12-28 RX ORDER — VENLAFAXINE HYDROCHLORIDE 150 MG/1
150 CAPSULE, EXTENDED RELEASE ORAL DAILY
COMMUNITY

## 2021-12-28 NOTE — LETTER
12/28/2021    Patient: Jorge Barton   YOB: 1970   Date of Visit: 12/28/2021     [address] [facility] [address] [id number]  Via Fax: [phone]    Dear Renee Cameron, DO,      Thank you for referring Ms. Rickey Lowe to [facility] for evaluation. My notes for this consultation are attached. If you have questions, please do not hesitate to call me. I look forward to following your patient along with you.       Sincerely,    Rubén Gr MD

## 2021-12-28 NOTE — PROGRESS NOTES
NATALIA Argueta    Subjective/HPI:     Laine Valladares is a 54 y.o. female is here for routine f/u. She has a PMHx of PVCs. Doing well today. Has rare episodes of palpitations. Denies complaints of chest pains, dizziness, orthopnea, PND or edema. Denies shortness of breath on exertion. Tolerating Toprol well, needs refilled. Current Outpatient Medications on File Prior to Visit   Medication Sig Dispense Refill    venlafaxine-SR (Effexor XR) 150 mg capsule Take 150 mg by mouth daily.  albuterol (PROVENTIL HFA, VENTOLIN HFA, PROAIR HFA) 90 mcg/actuation inhaler Take 1 Puff by inhalation every six (6) hours as needed.  Flovent Diskus 100 mcg/actuation dsdv inhaler        No current facility-administered medications on file prior to visit. Review of Symptoms:    Review of Systems   Constitutional: Negative for chills, fever and weight loss. HENT: Negative for nosebleeds. Eyes: Negative for blurred vision and double vision. Respiratory: Negative for cough, shortness of breath and wheezing. Cardiovascular: Negative for chest pain, palpitations, orthopnea, leg swelling and PND. Skin: Negative for rash. Neurological: Negative for dizziness and loss of consciousness. Physical Exam:      General: Well developed, in no acute distress, cooperative and alert  Heart:  reg rate and rhythm; normal S1/S2; no murmurs, no gallops or rubs. Respiratory: Clear bilaterally x 4, no wheezing or rales  Extremities:  Normal cap refill, no cyanosis, atraumatic. No edema.   Vascular: 2+ pulses symmetric in all extremities    Vitals:    12/28/21 1458 12/28/21 1530   BP: (!) 120/90 122/84   BP 1 Location: Right upper arm Left arm   BP Patient Position: Sitting Sitting   BP Cuff Size: Adult    Pulse: 77    Resp: 18    Height: 5' 3\" (1.6 m)    Weight: 192 lb 11.2 oz (87.4 kg)    SpO2: 96%        ECG done today shows sinus rhythm     Assessment:       ICD-10-CM ICD-9-CM    1. Primary hypertension  I10 401.9 AMB POC EKG ROUTINE W/ 12 LEADS, INTER & REP   2. Palpitations  R00.2 785.1         Plan:     1. Primary hypertension  BP controlled  Continue Toprol    2. Palpitations  Rare occurrences  Previous event monitor in 9/2020 with short burst of PAF with RVR, otherwise sinus rhythm with PVCs. Continue Toprol  Hold OAC due to low [id number] (sex). Patient seen and examined by me with nurse practitioner. Shanta Montilla personally performed all components of the history, physical, and medical decision making and agree with the assessment and plan with minor modifications as noted.     No recurrent palpitations; renew BB for another year    Colonel Rosario MD

## 2021-12-28 NOTE — PROGRESS NOTES
1. Have you been to the ER, urgent care clinic since your last visit? Hospitalized since your last visit? No    2. Have you seen or consulted any other health care providers outside of the [address] [place] [address] since your last visit? Include any pap smears or colon screening. No     Chief Complaint   Patient presents with    Hypertension     med refills. No cardiac concerns.

## 2022-01-10 ENCOUNTER — TELEPHONE (OUTPATIENT)
Dept: CARDIOLOGY CLINIC | Age: 52
End: 2022-01-10

## 2022-01-10 DIAGNOSIS — R00.2 PALPITATIONS: Primary | ICD-10-CM

## 2022-01-10 NOTE — TELEPHONE ENCOUNTER
Patient called and stated she has been feeling palpitations more frequently today without complaints of any chest pain or shortness of breath. Continues to take her metoprolol as prescribed and was last seen in office 12/28/21. Patient stated during her previous appointment it was discussed that if her palpitations became more frequent a monitor may be ordered. Please advise.

## 2022-01-10 NOTE — TELEPHONE ENCOUNTER
Called patient. She will have her   the monitor tomorrow. She is familiar with how to put it on as she has worn one previously.

## 2022-01-11 ENCOUNTER — CLINICAL SUPPORT (OUTPATIENT)
Dept: CARDIOLOGY CLINIC | Age: 52
End: 2022-01-11
Payer: COMMERCIAL

## 2022-01-11 DIAGNOSIS — R00.2 PALPITATIONS: Primary | ICD-10-CM

## 2022-01-11 PROCEDURE — 93270 REMOTE 30 DAY ECG REV/REPORT: CPT | Performed by: INTERNAL MEDICINE

## 2022-01-11 PROCEDURE — 93272 ECG/REVIEW INTERPRET ONLY: CPT | Performed by: INTERNAL MEDICINE

## 2022-01-11 NOTE — PROGRESS NOTES
Patient received a 2 week event monitor. Instructions given verbally as well as an instruction sheet. Pt verbalized understanding. Pt's  picked it up from the office, she has worn one before and wanted to put it on herself.     [facility] Event Monitoring

## 2022-01-31 ENCOUNTER — TELEPHONE (OUTPATIENT)
Dept: CARDIOLOGY CLINIC | Age: 52
End: 2022-01-31

## 2022-01-31 NOTE — PROGRESS NOTES
Event monitor showed PVCs which she was symptomatic with. We can increase her Toprol to 50 mg daily if she feels symptoms are still bothersome.   Otherwise, continue same dose of 25 mg daily    Thanks,  Melva Pang

## 2022-01-31 NOTE — TELEPHONE ENCOUNTER
----- Message from Cuca Tovar NP sent at 1/31/2022  2:26 PM EST -----  Event monitor showed PVCs which she was symptomatic with. We can increase her Toprol to 50 mg daily if she feels symptoms are still bothersome.   Otherwise, continue same dose of 25 mg daily    Thanks,  [employer]

## 2022-02-01 NOTE — TELEPHONE ENCOUNTER
Spoke with patient. Verified patient with two patient identifiers. Discussed symptomatic PVC's with pt. States the frequency has decreased some. States she would like to continue on Toprol 25 mg every day and increase to two tabs or 50 mg during the periods when PVC's increase in frequency with stress, etc.  She will let us know if they increase most of the time or if she has to stay on the increased dose of Toprol. Patient verbalized understanding.

## 2023-03-29 ENCOUNTER — TRANSCRIBE ORDER (OUTPATIENT)
Dept: SCHEDULING | Age: 53
End: 2023-03-29

## 2023-03-29 DIAGNOSIS — Z82.49 FAMILY HISTORY OF ISCHEMIC HEART DISEASE AND OTHER DISEASES OF THE CIRCULATORY SYSTEM: Primary | ICD-10-CM

## 2023-04-23 DIAGNOSIS — Z82.49 FAMILY HISTORY OF ISCHEMIC HEART DISEASE AND OTHER DISEASES OF THE CIRCULATORY SYSTEM: Primary | ICD-10-CM

## 2023-09-28 ENCOUNTER — HOSPITAL ENCOUNTER (OUTPATIENT)
Facility: HOSPITAL | Age: 53
Discharge: HOME | End: 2023-09-28
Attending: INTERNAL MEDICINE

## 2023-09-28 DIAGNOSIS — E78.5 HYPERLIPIDEMIA, UNSPECIFIED HYPERLIPIDEMIA TYPE: ICD-10-CM

## 2023-09-28 PROCEDURE — 75571 CT HRT W/O DYE W/CA TEST: CPT

## 2025-03-21 ENCOUNTER — TRANSCRIBE ORDERS (OUTPATIENT)
Facility: HOSPITAL | Age: 55
End: 2025-03-21

## 2025-03-21 DIAGNOSIS — Z12.31 ENCOUNTER FOR SCREENING MAMMOGRAM FOR MALIGNANT NEOPLASM OF BREAST: Primary | ICD-10-CM

## 2025-07-03 NOTE — TELEPHONE ENCOUNTER
----- Message from Naveen Billings NP sent at 9/9/2020  4:48 PM EDT -----  Please let pt know that she had some early beats which correlated to her symptom events. These are not considered harmful. She had one few second burst which wasn't marked as being a symptom event, that looked like it could be a fast beat coming from the top chambers of her heart. May have been atrial fibrillation. However, it was very brief. F/U with Dr Nadia Herrera to review.
Spoke with patient. Verified patient with two patient identifiers. Discussed monitor results in depth as noted below. Pt is calling to schedule appt with Dr. Jozef Siu to discuss short burst of a fib. Patient verbalized understanding.
advance directive as a gift to the people who care for you. If you have one, they won't have to make tough decisions by themselves.  For more information, including forms for your state, see the [employer] website (www.caringinfo.org/planning/advance-directives/).  Follow-up care is a key part of your treatment and safety. Be sure to make and go to all appointments, and call your doctor if you are having problems. It's also a good idea to know your test results and keep a list of the medicines you take.  What should you include in an advance directive?  Many states have a unique advance directive form. (It may ask you to address specific issues.) Or you might use a universal form that's approved by many states.  If your form doesn't tell you what to address, it may be hard to know what to include in your advance directive. Use the questions below to help you get started.  Who do you want to make decisions about your medical care if you are not able to?  What life-support measures do you want if you have a serious illness that gets worse over time or can't be cured?  What are you most afraid of that might happen? (Maybe you're afraid of having pain, losing your independence, or being kept alive by machines.)  Where would you prefer to die? (Your home? A hospital? A nursing home?)  Do you want to donate your organs when you die?  Do you want certain Religion practices performed before you die?  When should you call for help?  Be sure to contact your doctor if you have any questions.  Where can you learn more?  Go to https://www.[employer].net/patientEd and enter R264 to learn more about \"Advance Directives: Care Instructions.\"  Current as of: April 30, 2024  Content Version: 14.5  © 3062-8336 [employer].   Care instructions adapted under license by [employer]. If you have questions about a medical condition or this instruction, always ask your healthcare professional. [employer], disclaims